# Patient Record
Sex: FEMALE | Race: WHITE | NOT HISPANIC OR LATINO | Employment: UNEMPLOYED
[De-identification: names, ages, dates, MRNs, and addresses within clinical notes are randomized per-mention and may not be internally consistent; named-entity substitution may affect disease eponyms.]

---

## 2017-11-19 ENCOUNTER — HEALTH MAINTENANCE LETTER (OUTPATIENT)
Age: 27
End: 2017-11-19

## 2020-01-16 ENCOUNTER — HOSPITAL ENCOUNTER (EMERGENCY)
Facility: CLINIC | Age: 30
Discharge: HOME | End: 2020-01-16
Attending: PHYSICIAN ASSISTANT | Admitting: PHYSICIAN ASSISTANT
Payer: COMMERCIAL

## 2020-01-16 ENCOUNTER — APPOINTMENT (OUTPATIENT)
Dept: GENERAL RADIOLOGY | Facility: CLINIC | Age: 30
End: 2020-01-16
Attending: PHYSICIAN ASSISTANT
Payer: COMMERCIAL

## 2020-01-16 VITALS
HEART RATE: 64 BPM | SYSTOLIC BLOOD PRESSURE: 119 MMHG | WEIGHT: 170 LBS | TEMPERATURE: 98.2 F | OXYGEN SATURATION: 99 % | DIASTOLIC BLOOD PRESSURE: 81 MMHG | RESPIRATION RATE: 16 BRPM

## 2020-01-16 DIAGNOSIS — N39.0 URINARY TRACT INFECTION: ICD-10-CM

## 2020-01-16 LAB
ALBUMIN SERPL-MCNC: 3.8 G/DL (ref 3.4–5)
ALBUMIN UR-MCNC: 30 MG/DL
ALP SERPL-CCNC: 70 U/L (ref 40–150)
ALT SERPL W P-5'-P-CCNC: 16 U/L (ref 0–50)
ANION GAP SERPL CALCULATED.3IONS-SCNC: 5 MMOL/L (ref 3–14)
APPEARANCE UR: ABNORMAL
AST SERPL W P-5'-P-CCNC: 11 U/L (ref 0–45)
BACTERIA #/AREA URNS HPF: ABNORMAL /HPF
BASOPHILS # BLD AUTO: 0 10E9/L (ref 0–0.2)
BASOPHILS NFR BLD AUTO: 0.5 %
BILIRUB SERPL-MCNC: 0.2 MG/DL (ref 0.2–1.3)
BILIRUB UR QL STRIP: NEGATIVE
BUN SERPL-MCNC: 10 MG/DL (ref 7–30)
CALCIUM SERPL-MCNC: 8.7 MG/DL (ref 8.5–10.1)
CHLORIDE SERPL-SCNC: 106 MMOL/L (ref 94–109)
CO2 SERPL-SCNC: 26 MMOL/L (ref 20–32)
COLOR UR AUTO: ABNORMAL
CREAT SERPL-MCNC: 0.7 MG/DL (ref 0.52–1.04)
DIFFERENTIAL METHOD BLD: ABNORMAL
EOSINOPHIL # BLD AUTO: 0 10E9/L (ref 0–0.7)
EOSINOPHIL NFR BLD AUTO: 0.8 %
ERYTHROCYTE [DISTWIDTH] IN BLOOD BY AUTOMATED COUNT: 16.3 % (ref 10–15)
GFR SERPL CREATININE-BSD FRML MDRD: >90 ML/MIN/{1.73_M2}
GLUCOSE SERPL-MCNC: 88 MG/DL (ref 70–99)
GLUCOSE UR STRIP-MCNC: NEGATIVE MG/DL
HCG UR QL: NEGATIVE
HCT VFR BLD AUTO: 36.9 % (ref 35–47)
HGB BLD-MCNC: 11.6 G/DL (ref 11.7–15.7)
HGB UR QL STRIP: ABNORMAL
IMM GRANULOCYTES # BLD: 0 10E9/L (ref 0–0.4)
IMM GRANULOCYTES NFR BLD: 0.3 %
KETONES UR STRIP-MCNC: NEGATIVE MG/DL
LEUKOCYTE ESTERASE UR QL STRIP: ABNORMAL
LYMPHOCYTES # BLD AUTO: 1.4 10E9/L (ref 0.8–5.3)
LYMPHOCYTES NFR BLD AUTO: 37.9 %
MCH RBC QN AUTO: 24.5 PG (ref 26.5–33)
MCHC RBC AUTO-ENTMCNC: 31.4 G/DL (ref 31.5–36.5)
MCV RBC AUTO: 78 FL (ref 78–100)
MONOCYTES # BLD AUTO: 0.6 10E9/L (ref 0–1.3)
MONOCYTES NFR BLD AUTO: 16.5 %
MUCOUS THREADS #/AREA URNS LPF: PRESENT /LPF
NEUTROPHILS # BLD AUTO: 1.6 10E9/L (ref 1.6–8.3)
NEUTROPHILS NFR BLD AUTO: 44 %
NITRATE UR QL: POSITIVE
NRBC # BLD AUTO: 0 10*3/UL
NRBC BLD AUTO-RTO: 0 /100
PH UR STRIP: 5 PH (ref 5–7)
PLATELET # BLD AUTO: 294 10E9/L (ref 150–450)
POTASSIUM SERPL-SCNC: 3.7 MMOL/L (ref 3.4–5.3)
PROT SERPL-MCNC: 8.1 G/DL (ref 6.8–8.8)
RBC # BLD AUTO: 4.73 10E12/L (ref 3.8–5.2)
RBC #/AREA URNS AUTO: 3 /HPF (ref 0–2)
SODIUM SERPL-SCNC: 137 MMOL/L (ref 133–144)
SOURCE: ABNORMAL
SP GR UR STRIP: 1.02 (ref 1–1.03)
SQUAMOUS #/AREA URNS AUTO: 20 /HPF (ref 0–1)
UROBILINOGEN UR STRIP-MCNC: 0 MG/DL (ref 0–2)
WBC # BLD AUTO: 3.7 10E9/L (ref 4–11)
WBC #/AREA URNS AUTO: 11 /HPF (ref 0–5)

## 2020-01-16 PROCEDURE — 25800030 ZZH RX IP 258 OP 636: Performed by: PHYSICIAN ASSISTANT

## 2020-01-16 PROCEDURE — 71046 X-RAY EXAM CHEST 2 VIEWS: CPT

## 2020-01-16 PROCEDURE — 99284 EMERGENCY DEPT VISIT MOD MDM: CPT | Mod: Z6 | Performed by: PHYSICIAN ASSISTANT

## 2020-01-16 PROCEDURE — 96365 THER/PROPH/DIAG IV INF INIT: CPT | Performed by: PHYSICIAN ASSISTANT

## 2020-01-16 PROCEDURE — 81001 URINALYSIS AUTO W/SCOPE: CPT | Performed by: PHYSICIAN ASSISTANT

## 2020-01-16 PROCEDURE — 87186 SC STD MICRODIL/AGAR DIL: CPT | Performed by: PHYSICIAN ASSISTANT

## 2020-01-16 PROCEDURE — 87088 URINE BACTERIA CULTURE: CPT | Performed by: PHYSICIAN ASSISTANT

## 2020-01-16 PROCEDURE — 99284 EMERGENCY DEPT VISIT MOD MDM: CPT | Mod: 25 | Performed by: PHYSICIAN ASSISTANT

## 2020-01-16 PROCEDURE — 80053 COMPREHEN METABOLIC PANEL: CPT | Performed by: PHYSICIAN ASSISTANT

## 2020-01-16 PROCEDURE — 96375 TX/PRO/DX INJ NEW DRUG ADDON: CPT | Performed by: PHYSICIAN ASSISTANT

## 2020-01-16 PROCEDURE — 25000128 H RX IP 250 OP 636: Performed by: PHYSICIAN ASSISTANT

## 2020-01-16 PROCEDURE — 87086 URINE CULTURE/COLONY COUNT: CPT | Performed by: PHYSICIAN ASSISTANT

## 2020-01-16 PROCEDURE — 81025 URINE PREGNANCY TEST: CPT | Performed by: PHYSICIAN ASSISTANT

## 2020-01-16 PROCEDURE — 96361 HYDRATE IV INFUSION ADD-ON: CPT | Performed by: PHYSICIAN ASSISTANT

## 2020-01-16 PROCEDURE — 85025 COMPLETE CBC W/AUTO DIFF WBC: CPT | Performed by: PHYSICIAN ASSISTANT

## 2020-01-16 RX ORDER — SULFAMETHOXAZOLE/TRIMETHOPRIM 800-160 MG
1 TABLET ORAL 2 TIMES DAILY
Qty: 14 TABLET | Refills: 0 | Status: SHIPPED | OUTPATIENT
Start: 2020-01-16 | End: 2020-01-23

## 2020-01-16 RX ORDER — SODIUM CHLORIDE 9 MG/ML
1000 INJECTION, SOLUTION INTRAVENOUS CONTINUOUS
Status: DISCONTINUED | OUTPATIENT
Start: 2020-01-16 | End: 2020-01-16 | Stop reason: HOSPADM

## 2020-01-16 RX ORDER — ONDANSETRON 2 MG/ML
4 INJECTION INTRAMUSCULAR; INTRAVENOUS ONCE
Status: COMPLETED | OUTPATIENT
Start: 2020-01-16 | End: 2020-01-16

## 2020-01-16 RX ORDER — BENZONATATE 200 MG/1
200 CAPSULE ORAL 3 TIMES DAILY PRN
Qty: 30 CAPSULE | Refills: 0 | Status: SHIPPED | OUTPATIENT
Start: 2020-01-16 | End: 2020-02-15

## 2020-01-16 RX ORDER — CEFTRIAXONE SODIUM 1 G/50ML
1 INJECTION, SOLUTION INTRAVENOUS ONCE
Status: COMPLETED | OUTPATIENT
Start: 2020-01-16 | End: 2020-01-16

## 2020-01-16 RX ADMIN — SODIUM CHLORIDE 1000 ML: 9 INJECTION, SOLUTION INTRAVENOUS at 10:48

## 2020-01-16 RX ADMIN — ONDANSETRON 4 MG: 2 INJECTION INTRAMUSCULAR; INTRAVENOUS at 10:48

## 2020-01-16 RX ADMIN — CEFTRIAXONE SODIUM 1 G: 1 INJECTION, SOLUTION INTRAVENOUS at 11:55

## 2020-01-16 ASSESSMENT — ENCOUNTER SYMPTOMS
CHILLS: 1
FEVER: 1
FLANK PAIN: 0
ABDOMINAL PAIN: 1
EYE DISCHARGE: 0
HEADACHES: 1
COUGH: 1
ACTIVITY CHANGE: 1
SHORTNESS OF BREATH: 0
WEAKNESS: 0
VOMITING: 1
SORE THROAT: 0
DIZZINESS: 1
DIARRHEA: 1
FATIGUE: 1
BACK PAIN: 1
PALPITATIONS: 0
PHOTOPHOBIA: 0
LIGHT-HEADEDNESS: 1
NAUSEA: 1
EYE ITCHING: 0
APPETITE CHANGE: 1
WHEEZING: 0
BLOOD IN STOOL: 0
WOUND: 0
EYE REDNESS: 0
COLOR CHANGE: 0
EYE PAIN: 0

## 2020-01-16 NOTE — ED AVS SNAPSHOT
[facility] Emergency Department  [address] [address]  Phone:  [phone]  Fax:  [phone]                                    Meme Mcintosh   MRN: [id number]    Department:  [facility] Emergency Department   Date of Visit:  1/16/2020           After Visit Summary Signature Page    I have received my discharge instructions, and my questions have been answered. I have discussed any challenges I see with this plan with the nurse or doctor.    ..........................................................................................................................................  Patient/Patient Representative Signature      ..........................................................................................................................................  Patient Representative Print Name and Relationship to Patient    ..................................................               ................................................  Date                                   Time    ..........................................................................................................................................  Reviewed by Signature/Title    ...................................................              ..............................................  Date                                               Time          22EPIC Rev 08/18

## 2020-01-16 NOTE — ED PROVIDER NOTES
History     Chief Complaint   Patient presents with     Emesis     onset on monday with diarrhea     Dizziness     Back Pain     Headache     HPI  Meme Mcintosh is a 29 year old female who presents to the emergency department with concern over multiple symptoms from present for the last 4 days.  Patient complained of fever up to 102.5, chills, myalgias, headache, chest pain, cough, nausea, vomiting, diarrhea, discomfort.  She is unable to quantify exactly how many time she vomited per day and states that while she has had nausea some episodes of vomiting more clearly posttussive.  She similarly unable to quantify her diarrhea.  She became more concerned when she reports that she coughed and vomited this morning and there was a small amount of blood tinged material present.  She also complains of dizziness which she describes as sensation of vertigo room/spinning and left ear pain. She denies any hearing changes.  She does complain of dysuria, increased urgency.  She denies any melena, hematochezia, hematuria, wheezing.  She denies any history of significant past medical issues, epic records indicate she was evaluated in the emergency department for pyelonephritis several years ago.  Past surgical history is significant for tubal ligation in 2014.  She denies any smoking history.  She is exposed to secondhand smoke through her significant other however    Allergies:  No Known Allergies    Problem List:    Patient Active Problem List    Diagnosis Date Noted     LSIL (low grade squamous intraepithelial lesion) on Pap smear 11/21/2012     Priority: Medium     11/16/12:Pap--LSIL. Plan [place]    2/20/13: [place] and will be transferring care. Pap tracking file closed.        Generalized anxiety disorder 11/16/2012     Priority: Medium     Diagnosis updated by automated process. Provider to review and confirm.       CARDIOVASCULAR SCREENING; LDL GOAL LESS THAN 160 10/09/2012     Priority: Medium      Past  Medical History:    Past Medical History:   Diagnosis Date     LSIL (low grade squamous intraepithelial lesion) on Pap smear 11/2012     Past Surgical History:    No past surgical history on file.    Family History:    No family history on file.    Social History:  Marital Status:  Single [1]  Social History     Tobacco Use     Smoking status: Never Smoker     Smokeless tobacco: Never Used   Substance Use Topics     Alcohol use: No     Drug use: No      Medications:    ibuprofen (ADVIL,MOTRIN) 200 MG tablet      Review of Systems   Constitutional: Positive for activity change, appetite change, chills, fatigue and fever.   HENT: Positive for congestion and ear pain (left ear). Negative for sore throat.    Eyes: Negative for photophobia, pain, discharge, redness, itching and visual disturbance.   Respiratory: Positive for cough. Negative for shortness of breath and wheezing.    Cardiovascular: Positive for chest pain. Negative for palpitations and leg swelling.   Gastrointestinal: Positive for abdominal pain, diarrhea, nausea and vomiting. Negative for blood in stool.   Genitourinary: Negative for flank pain.   Musculoskeletal: Positive for back pain.   Skin: Negative for color change, rash and wound.   Neurological: Positive for dizziness, light-headedness and headaches. Negative for syncope and weakness.     Physical Exam   BP: 116/80  Heart Rate: 99  Temp: 98.2  F (36.8  C)  Resp: 16  Weight: 77.1 kg (170 lb)  SpO2: 100 %  Physical Exam  GENERAL APPEARANCE: healthy, alert and no distress  EYES: EOMI,  PERRL, conjunctiva clear  HENT: ear canals and TM's normal.  Nose and mouth without ulcers, erythema or lesions  NECK: supple, nontender, no lymphadenopathy  RESP: lungs clear to auscultation - no rales, rhonchi or wheezes  CV: regular rates and rhythm, normal S1 S2, no murmur noted  ABDOMEN:  soft, nontender, no HSM or masses and bowel sounds normal, no CVA tenderness   NEURO: Normal strength and tone, sensory exam  grossly normal,  normal speech and mentation, CN III-XII grossly intact   SKIN: no suspicious lesions or rashes  ED Course        Procedures        Critical Care time:  none        Results for orders placed or performed during the hospital encounter of 01/16/20 (from the past 24 hour(s))   CBC with platelets, differential   Result Value Ref Range    WBC 3.7 (L) 4.0 - 11.0 10e9/L    RBC Count 4.73 3.8 - 5.2 10e12/L    Hemoglobin 11.6 (L) 11.7 - 15.7 g/dL    Hematocrit 36.9 35.0 - 47.0 %    MCV 78 78 - 100 fl    MCH 24.5 (L) 26.5 - 33.0 pg    MCHC 31.4 (L) 31.5 - 36.5 g/dL    RDW 16.3 (H) 10.0 - 15.0 %    Platelet Count 294 150 - 450 10e9/L    Diff Method Automated Method     % Neutrophils 44.0 %    % Lymphocytes 37.9 %    % Monocytes 16.5 %    % Eosinophils 0.8 %    % Basophils 0.5 %    % Immature Granulocytes 0.3 %    Nucleated RBCs 0 0 /100    Absolute Neutrophil 1.6 1.6 - 8.3 10e9/L    Absolute Lymphocytes 1.4 0.8 - 5.3 10e9/L    Absolute Monocytes 0.6 0.0 - 1.3 10e9/L    Absolute Eosinophils 0.0 0.0 - 0.7 10e9/L    Absolute Basophils 0.0 0.0 - 0.2 10e9/L    Abs Immature Granulocytes 0.0 0 - 0.4 10e9/L    Absolute Nucleated RBC 0.0    Comprehensive metabolic panel   Result Value Ref Range    Sodium 137 133 - 144 mmol/L    Potassium 3.7 3.4 - 5.3 mmol/L    Chloride 106 94 - 109 mmol/L    Carbon Dioxide 26 20 - 32 mmol/L    Anion Gap 5 3 - 14 mmol/L    Glucose 88 70 - 99 mg/dL    Urea Nitrogen 10 7 - 30 mg/dL    Creatinine 0.70 0.52 - 1.04 mg/dL    GFR Estimate >90 >60 mL/min/[1.73_m2]    GFR Estimate If Black >90 >60 mL/min/[1.73_m2]    Calcium 8.7 8.5 - 10.1 mg/dL    Bilirubin Total 0.2 0.2 - 1.3 mg/dL    Albumin 3.8 3.4 - 5.0 g/dL    Protein Total 8.1 6.8 - 8.8 g/dL    Alkaline Phosphatase 70 40 - 150 U/L    ALT 16 0 - 50 U/L    AST 11 0 - 45 U/L   HCG qualitative urine   Result Value Ref Range    HCG Qual Urine Negative NEG^Negative   UA with Microscopic   Result Value Ref Range    Color Urine Elo      Appearance Urine Cloudy     Glucose Urine Negative NEG^Negative mg/dL    Bilirubin Urine Negative NEG^Negative    Ketones Urine Negative NEG^Negative mg/dL    Specific Gravity Urine 1.023 1.003 - 1.035    Blood Urine Moderate (A) NEG^Negative    pH Urine 5.0 5.0 - 7.0 pH    Protein Albumin Urine 30 (A) NEG^Negative mg/dL    Urobilinogen mg/dL 0.0 0.0 - 2.0 mg/dL    Nitrite Urine Positive (A) NEG^Negative    Leukocyte Esterase Urine Small (A) NEG^Negative    Source Midstream Urine     WBC Urine 11 (H) 0 - 5 /HPF    RBC Urine 3 (H) 0 - 2 /HPF    Bacteria Urine Many (A) NEG^Negative /HPF    Squamous Epithelial /HPF Urine 20 (H) 0 - 1 /HPF    Mucous Urine Present (A) NEG^Negative /LPF   Chest XR,  PA & LAT    Narrative    CHEST TWO VIEWS  1/16/2020 11:26 AM     HISTORY: 29-year-old woman with cough.    COMPARISON: None      Impression    IMPRESSION: Heart size is normal. No pleural effusion, pneumothorax,  or abnormal area of consolidation.      Medications   0.9% sodium chloride BOLUS (0 mLs Intravenous Stopped 1/16/20 1155)     Followed by   sodium chloride 0.9% infusion (has no administration in time range)   cefTRIAXone in d5w (ROCEPHIN) intermittent infusion 1 g (1 g Intravenous New Bag 1/16/20 1155)   ondansetron (ZOFRAN) injection 4 mg (4 mg Intravenous Given 1/16/20 1048)     Assessments & Plan (with Medical Decision Making)     I have reviewed the nursing notes.  I have reviewed the findings, diagnosis, plan and need for follow up with the patient.     New Prescriptions    BENZONATATE (TESSALON) 200 MG CAPSULE    Take 1 capsule (200 mg) by mouth 3 times daily as needed for cough    SULFAMETHOXAZOLE-TRIMETHOPRIM (BACTRIM DS) 800-160 MG TABLET    Take 1 tablet by mouth 2 times daily for 7 days     Final diagnoses:   Urinary tract infection     29-year-old female presents to the emergency department accompanied by significant other with concern over 4-day history of multiple complaints including fever, chills,  myalgias, cough, chest pain, nausea, vomiting, diarrhea.  She had stable vital signs upon arrival.  Physical exam findings as described above included benign nonsurgical abdominal exam.  As part of evaluation patient did have laboratory testing including CBC which showed a low total WBC at 3.7, mild anemia with hemoglobin of 11.6.  Non-concerning metabolic panel.  Urinalysis did show evidence of infection with positive for nitrates, many bacteria, however did not have as many WBC as I would typically expect with pyelonephritis and did have numerous squamous epithelial cells.  She had a chest x-ray which did not demonstrate any evidence of pneumothorax, pleural effusion or change in cardiopulmonary vasculature.  I suspect that she likely has a viral illness, would consider possibility of influenza however given duration of symptoms, absence of treatment options patient declined testing at this time.  Differential would also include gastroenteritis, other viral URI.  I do not suspect MI/ACS.  She was treated with dose of Zofran, IV fluids, rocephin and did report some symptomatic relief.  She was discharged home stable with prescription for Bactrim pending urine culture results from today's visit.  Follow-up if no improvement within the next 3-5 days.  Worrisome reasons to return to ER sooner discussed.     Disclaimer: This note consists of symbols derived from keyboarding, dictation, and/or voice recognition software. As a result, there may be errors in the script that have gone undetected.  Please consider this when interpreting information found in the chart.    1/16/2020   [facility] EMERGENCY DEPARTMENT     Elo Anne PA-C  01/16/20 2254

## 2020-01-16 NOTE — RESULT ENCOUNTER NOTE
Emergency Dept/Urgent Care discharge antibiotic (if prescribed): Sulfamethoxazole-Trimethoprim (Bactrim DS, Septra DS) 800-160 mg PO tablet,  1 tablet by mouth 2 times daily for 7 days.  Date of Rx (if applicable):  1/16/2020  No changes in treatment per Urine culture protocol.

## 2020-01-19 LAB
BACTERIA SPEC CULT: ABNORMAL
Lab: ABNORMAL
SPECIMEN SOURCE: ABNORMAL

## 2020-01-20 ENCOUNTER — TELEPHONE (OUTPATIENT)
Dept: EMERGENCY MEDICINE | Facility: CLINIC | Age: 30
End: 2020-01-20

## 2020-01-21 NOTE — TELEPHONE ENCOUNTER
[facility] Emergency Department/Urgent Care Lab result notification [Positive for uti and bacteria is susceptible to antibiotic]:    Reason for call:   Notify of Final urine culture result, confirm patient is taking antibiotic, assess symptoms, and advise per Emergency Dept/Urgent Care discharge instructions and Emergency Dept urine culture protocol.    Lab Result & Date of Final Report [copied from Result Note]:    Final Urine Culture Report on 1/19/20  Emergency Dept/Urgent Care discharge antibiotic prescribed: Sulfamethoxazole-Trimethoprim (Bactrim DS, Septra DS) 800-160 mg PO tablet,  1 tablet by mouth 2 times daily for 7 days.  #1. Bacteria, >100,000 colonies/mL Escherichia coli, is SUSCEPTIBLE to Antibiotic.    As per [place] ED Lab Result protocol, no change in antibiotic therapy.    Current symptoms (include time patient called):    6;28PM: Spoke with patient. She states she is improving.     Recommendations/Instructions:   Patient notified of lab result and treatment recommendation.   Take antibiotic as directed by the Emergency Dept/Urgent Care Provider.  Advised to follow up with PCP as directed by the ED provider.  The patient is comfortable with the information given and was transferred to scheduling to make a clinic appointment.     UTI prevention/education reviewed with patient [Yes/No]:  Yes    Please contact you PCP or return to the Emergency Department if your:    Symptoms worsen or other concerning symptoms    Gena Roberson RN  InComm RN  Lung Nodule and ED Lab Result RN  Epic pool (ED late result f/u RN): [id number]  [facility] INCIDENTAL RADIOLOGY F/U NURSES: [id number]  # [phone]

## 2020-10-21 ENCOUNTER — VIRTUAL VISIT (OUTPATIENT)
Dept: FAMILY MEDICINE | Facility: CLINIC | Age: 30
End: 2020-10-21
Payer: COMMERCIAL

## 2020-10-21 ENCOUNTER — ALLIED HEALTH/NURSE VISIT (OUTPATIENT)
Dept: FAMILY MEDICINE | Facility: CLINIC | Age: 30
End: 2020-10-21
Payer: COMMERCIAL

## 2020-10-21 DIAGNOSIS — J02.9 SORE THROAT: ICD-10-CM

## 2020-10-21 DIAGNOSIS — J02.0 STREPTOCOCCAL PHARYNGITIS: Primary | ICD-10-CM

## 2020-10-21 LAB
DEPRECATED S PYO AG THROAT QL EIA: POSITIVE
SPECIMEN SOURCE: ABNORMAL

## 2020-10-21 PROCEDURE — 99207 PR NO CHARGE NURSE ONLY: CPT

## 2020-10-21 PROCEDURE — 99213 OFFICE O/P EST LOW 20 MIN: CPT | Mod: 95 | Performed by: NURSE PRACTITIONER

## 2020-10-21 PROCEDURE — 87880 STREP A ASSAY W/OPTIC: CPT | Performed by: NURSE PRACTITIONER

## 2020-10-21 RX ORDER — AMOXICILLIN 500 MG/1
500 CAPSULE ORAL 2 TIMES DAILY
Qty: 20 CAPSULE | Refills: 0 | Status: SHIPPED | OUTPATIENT
Start: 2020-10-21 | End: 2020-10-31

## 2020-10-21 ASSESSMENT — ENCOUNTER SYMPTOMS
SINUS PRESSURE: 0
HEADACHES: 0
DIARRHEA: 0
COUGH: 0
MYALGIAS: 0
FATIGUE: 1
SHORTNESS OF BREATH: 0
FEVER: 0
CHILLS: 0
CARDIOVASCULAR NEGATIVE: 1
RHINORRHEA: 0
ABDOMINAL PAIN: 0
SORE THROAT: 1
VOMITING: 0
NAUSEA: 0
APPETITE CHANGE: 0

## 2020-10-21 NOTE — PATIENT INSTRUCTIONS
Your rapid strep test was positive. You have strep throat.  1. Take antibiotics as prescribed.  2. You are contagious until you've been on antibiotics for 24 hours.   3. Take a probiotics or yogurt daily while on antibiotics and for ~1 week afterwards to prevent stomach upset.  4. Rest, hydration, humidification.  5. Cepacol lozenges can help with sore throat.  6. Change tooth brush out or disinfect it if it is an electric tooth brush after being on antibiotics for 24 hours.   7. Warm liquids/tea with honey can help relieve sore throat.

## 2020-10-21 NOTE — PROGRESS NOTES
"Meme Mcintosh is a 30 year old female who is being evaluated via a billable telephone visit.      The patient has been notified of following:     \"This telephone visit will be conducted via a call between you and your physician/provider. We have found that certain health care needs can be provided without the need for a physical exam.  This service lets us provide the care you need with a short phone conversation.  If a prescription is necessary we can send it directly to your pharmacy.  If lab work is needed we can place an order for that and you can then stop by our lab to have the test done at a later time.    Telephone visits are billed at different rates depending on your insurance coverage. During this emergency period, for some insurers they may be billed the same as an in-person visit.  Please reach out to your insurance provider with any questions.    If during the course of the call the physician/provider feels a telephone visit is not appropriate, you will not be charged for this service.\"    Patient has given verbal consent for Telephone visit?  Yes    What phone number would you like to be contacted at? [phone]    How would you like to obtain your AVS? Mail a copy    Subjective     Meme Mcintosh is a 30 year old female who presents via phone visit today for the following health issues:    HPI       Concern for strep  Declined to respond to COVID questions  Patient thinks her and her 2 children have strep throat  About how many days ago did these symptoms start? Since 10/17/2020  Is this your first visit for this illness? Yes  In the 14 days before your symptoms started, have you had close contact with someone with COVID-19 (Coronavirus)? No  Exposure to strep  Do you have a fever or chills? No  Are you having new or worsening difficulty breathing? No  Do you have new or worsening cough? No  Have you had any new or unexplained body aches? No    Have you experienced any of the following NEW " symptoms?    Headache: No    Sore throat: YES    Loss of taste or smell: No    Chest pain: No    Diarrhea: No    Rash: No  What treatments have you tried? none  Who do you live with? Declined to respond to COVID questions  Are you, or a household member, a healthcare worker or a ? Declined to respond to COVID questions  Do you live in a nursing home, group home, or shelter? No  Do you have a way to get food/medications if quarantined? Declined to respond to COVID questions          Additional provider notes: Sore throat started Saturday night. Also have 2 kids with similar symptoms. No fevers. She visited their friend who's daughter was being treated for strep.     Review of Systems   Constitutional: Positive for fatigue. Negative for appetite change, chills and fever.   HENT: Positive for ear pain and sore throat. Negative for congestion, rhinorrhea and sinus pressure.    Respiratory: Negative for cough and shortness of breath.    Cardiovascular: Negative.    Gastrointestinal: Negative for abdominal pain, diarrhea, nausea and vomiting.   Musculoskeletal: Negative for myalgias.   Skin: Negative for rash.   Neurological: Negative for headaches.             Objective          Vitals:  No vitals were obtained today due to virtual visit.    healthy, alert, mild distress and cooperative  PSYCH: Alert and oriented times 3; coherent speech, normal   rate and volume, able to articulate logical thoughts, able   to abstract reason, no tangential thoughts, no hallucinations   or delusions  Her affect is normal  RESP: No cough, no audible wheezing, able to talk in full sentences  Remainder of exam unable to be completed due to telephone visits            Assessment/Plan:    Assessment & Plan     Streptococcal pharyngitis  Patient declined COVID testing with strep testing despite meeting [facility] guidelines for testing. Patient and kids came to clinic for drive up throat swab. Strep test came back positive. Amoxil  prescribed. Side effects, risks and benefits of medication were discussed with patient. Discussed how and when to take medication. Recommended taking a probiotic and/or eating yogurt every day while on antibiotics and for ~1 week after stopping antibiotics to prevent GI upset. Discussed OTC recommendations for symptom control. Rest, hydration. Discussed contagiousness and when to change out tooth brush.     *Advised mom that if symptoms do not improve by the weekend, she should get COVID testing done as there is always the chance of having both at the same time especially since her children did not come back positive for strep today. Patient in agreement. She is going to quarantine through the weekend and get COVID testing if symptoms do not improve.     - amoxicillin (AMOXIL) 500 MG capsule; Take 1 capsule (500 mg) by mouth 2 times daily for 10 days    Sore throat  Positive.     - Streptococcus A Rapid Scr w Reflx to PCR; Future        Patient Instructions   Your rapid strep test was positive. You have strep throat.  1. Take antibiotics as prescribed.  2. You are contagious until you've been on antibiotics for 24 hours.   3. Take a probiotics or yogurt daily while on antibiotics and for ~1 week afterwards to prevent stomach upset.  4. Rest, hydration, humidification.  5. Cepacol lozenges can help with sore throat.  6. Change tooth brush out or disinfect it if it is an electric tooth brush after being on antibiotics for 24 hours.   7. Warm liquids/tea with honey can help relieve sore throat.        Return if symptoms worsen or fail to improve.    FELICITA Deshpande [facility]    Phone call duration:  7 minutes

## 2022-08-31 ENCOUNTER — HOSPITAL ENCOUNTER (EMERGENCY)
Facility: CLINIC | Age: 32
Discharge: HOME | End: 2022-08-31
Attending: PHYSICIAN ASSISTANT | Admitting: PHYSICIAN ASSISTANT
Payer: COMMERCIAL

## 2022-08-31 ENCOUNTER — APPOINTMENT (OUTPATIENT)
Dept: GENERAL RADIOLOGY | Facility: CLINIC | Age: 32
End: 2022-08-31
Attending: PHYSICIAN ASSISTANT
Payer: COMMERCIAL

## 2022-08-31 VITALS
OXYGEN SATURATION: 99 % | HEART RATE: 60 BPM | DIASTOLIC BLOOD PRESSURE: 69 MMHG | SYSTOLIC BLOOD PRESSURE: 105 MMHG | TEMPERATURE: 97 F

## 2022-08-31 DIAGNOSIS — S20.229A BACK CONTUSION: ICD-10-CM

## 2022-08-31 PROCEDURE — 72100 X-RAY EXAM L-S SPINE 2/3 VWS: CPT

## 2022-08-31 PROCEDURE — G0463 HOSPITAL OUTPT CLINIC VISIT: HCPCS | Performed by: PHYSICIAN ASSISTANT

## 2022-08-31 PROCEDURE — 99213 OFFICE O/P EST LOW 20 MIN: CPT | Performed by: PHYSICIAN ASSISTANT

## 2022-08-31 PROCEDURE — 72072 X-RAY EXAM THORAC SPINE 3VWS: CPT

## 2022-08-31 RX ORDER — CYCLOBENZAPRINE HCL 10 MG
10 TABLET ORAL 3 TIMES DAILY PRN
Qty: 20 TABLET | Refills: 0 | Status: SHIPPED | OUTPATIENT
Start: 2022-08-31 | End: 2022-09-07

## 2022-08-31 ASSESSMENT — ENCOUNTER SYMPTOMS
NAUSEA: 0
FREQUENCY: 1
COLOR CHANGE: 0
FEVER: 0
CHILLS: 0
SHORTNESS OF BREATH: 0
COUGH: 0
WEAKNESS: 0
WHEEZING: 0
DYSURIA: 1
VOMITING: 0
PALPITATIONS: 0
BACK PAIN: 1
NUMBNESS: 0
WOUND: 0

## 2022-08-31 NOTE — ED PROVIDER NOTES
History     Chief Complaint   Patient presents with     Back Pain     Had car door hit back on Monday,and today is having worsening back pain.      HPI  Meme Mcintosh is a 32 year old female who presents to urgent care with concern over back pain which been present for the last 3 days.  Patient reports that she was leaning forward into her trunk when the lid of the trunk fell hitting her back.  She had sudden onset of pain which has been gradually worsening with time.  She describes it as a sharp, throbbing.  It radiates to her shoulder blades bilaterally and down both of her legs to to toes.  It is exacerbated by movements, palpation,  deep breathing,   She has had some possible ecchymosis per her boyfriend.  She also complains of dysuria, increased urinary frequency, urgency and paresthesias in her lower legs.  She denies any objective fever, chills, allergies, cough, chest pain, dyspnea, wheezing, nausea, vomiting, abdominal pain, saddle numbness or paresthesias or loss of control of her bowel or bladder.  She has been attempting to treat with doses of ibuprofen and Tylenol without relief.    Allergies:  No Known Allergies    Problem List:    Patient Active Problem List    Diagnosis Date Noted     LSIL (low grade squamous intraepithelial lesion) on Pap smear 11/21/2012     Priority: Medium     11/16/12:Pap--LSIL. Plan [place]    2/20/13: [place] and will be [place] care. Pap tracking file closed.        Generalized anxiety disorder 11/16/2012     Priority: Medium     Diagnosis updated by automated process. Provider to review and confirm.       CARDIOVASCULAR SCREENING; LDL GOAL LESS THAN 160 10/09/2012     Priority: Medium        Past Medical History:    Past Medical History:   Diagnosis Date     LSIL (low grade squamous intraepithelial lesion) on Pap smear 11/2012       Past Surgical History:    History reviewed. No pertinent surgical history.    Family History:    History reviewed. No  pertinent family history.    Social History:  Marital Status:  Single [1]  Social History     Tobacco Use     Smoking status: Never Smoker     Smokeless tobacco: Never Used   Substance Use Topics     Alcohol use: No     Drug use: No        Medications:    ibuprofen (ADVIL,MOTRIN) 200 MG tablet      Review of Systems   Constitutional: Negative for chills and fever.   Respiratory: Negative for cough, shortness of breath and wheezing.    Cardiovascular: Negative for chest pain and palpitations.   Gastrointestinal: Negative for nausea and vomiting.   Genitourinary: Positive for dysuria, frequency and urgency.   Musculoskeletal: Positive for back pain.   Skin: Negative for color change, rash and wound.   Neurological: Negative for weakness and numbness.     Physical Exam   BP: 105/69  Pulse: 60  Temp: 97  F (36.1  C)  SpO2: 99 %  Physical Exam  Constitutional:       Appearance: She is not ill-appearing or toxic-appearing.   HENT:      Head: Normocephalic and atraumatic.   Cardiovascular:      Rate and Rhythm: Normal rate and regular rhythm.      Heart sounds: No murmur heard.    No friction rub. No gallop.   Pulmonary:      Effort: Pulmonary effort is normal. No respiratory distress.      Breath sounds: Normal breath sounds. No wheezing, rhonchi or rales.   Musculoskeletal:      Cervical back: Normal.      Thoracic back: Tenderness present. No swelling, edema, deformity, lacerations or spasms. Decreased range of motion. No scoliosis.      Lumbar back: Tenderness present. No swelling, edema, deformity, signs of trauma, lacerations or spasms. Decreased range of motion.      Comments: Patient complains of significant tenderness to palpation to light touch of skin of back    Skin:     General: Skin is warm and dry.      Findings: No abrasion, ecchymosis, erythema, laceration or rash.   Neurological:      Mental Status: She is alert.      Sensory: No sensory deficit.      Deep Tendon Reflexes:      Reflex Scores:        Patellar reflexes are 2+ on the right side and 2+ on the left side.        ED Course           Procedures       Critical Care time:  none          Results for orders placed or performed during the hospital encounter of 08/31/22   Thoracic spine XR, 3 views     Status: None    Narrative    THORACIC SPINE THREE VIEWS  8/31/2022 2:15 PM     COMPARISON: None.    HISTORY: Pain after truck of car fell onto back as she was lying  forward.      Impression    IMPRESSION: Normal alignment. Vertebral body heights normal. No  fractures.    AKI HALLMAN MD         SYSTEM ID:  [id number]   Lumbar spine XR, 2-3 views     Status: None    Narrative    LUMBAR SPINE TWO TO THREE VIEWS August 31, 2022 2:14 PM     HISTORY: Pain after object fell on back.    COMPARISON: None.      Impression    IMPRESSION: Normal alignment. Vertebral body heights normal. No  fractures. No significant degenerative change.    AKI HALLMAN MD         SYSTEM ID:  [id number]       Medications - No data to display    Assessments & Plan (with Medical Decision Making)     I have reviewed the nursing notes.    I have reviewed the findings, diagnosis, plan and need for follow up with the patient.       Discharge Medication List as of 8/31/2022  2:44 PM      START taking these medications    Details   cyclobenzaprine (FLEXERIL) 10 MG tablet Take 1 tablet (10 mg) by mouth 3 times daily as needed for muscle spasms, Disp-20 tablet, R-0, E-Prescribe           Final diagnoses:   Back contusion     22-year-old female presents to urgent care with concern over back pain after a she reports that barnard of truck fell on her back as she was leaning forward.  She had stable vital signs upon arrival.  Physical exam findings significant for pain to light touch diffusely throughout her back, decreased range of motion.  No objective ecchymosis, swelling.  She had x-ray of her lumbar, thoracic spine which was negative for acute bony abnormality.  Symptoms most consistent with back  contusion would consider muscle sprain/strain.  I do not suspect DDD, spinal stenosis, herniated disc.  I have low suspicion for pyelonephritis however given her state urinary complaints I did recommend urinalysis which patient declined to complete.  She was discharged home stable with instructions for symptomatic treatment with rest, ice/heat, Tylenol/ibuprofen.  Prescription for Flexeril given.  Follow-up if no improvement within the next 7 days.  Worrisome reasons to return to the ER/UC sooner discussed.    Disclaimer: This note consists of symbols derived from keyboarding, dictation, and/or voice recognition software. As a result, there may be errors in the script that have gone undetected.  Please consider this when interpreting information found in the chart.      8/31/2022   [facility] EMERGENCY DEPT     Elo Anne PA-C  09/02/22 2011

## 2022-08-31 NOTE — Clinical Note
Meme Mcintosh was seen and treated in our emergency department on 8/31/2022.    I recommend she rest her back with limited activity only as tolerated by comfort for the next 5 days until her next follow-up appointment.  During this time she should avoid any heavy lifting greater than 10 pounds, twisting, turning of the back, repetitive movements or other activities that exacerbate her symptoms.       Sincerely,     [facility] Emergency Dept

## 2022-09-07 ENCOUNTER — TELEPHONE (OUTPATIENT)
Dept: FAMILY MEDICINE | Facility: CLINIC | Age: 32
End: 2022-09-07

## 2022-09-07 ENCOUNTER — OFFICE VISIT (OUTPATIENT)
Dept: FAMILY MEDICINE | Facility: CLINIC | Age: 32
End: 2022-09-07
Payer: COMMERCIAL

## 2022-09-07 VITALS
DIASTOLIC BLOOD PRESSURE: 68 MMHG | OXYGEN SATURATION: 98 % | BODY MASS INDEX: 23.02 KG/M2 | TEMPERATURE: 97.5 F | HEART RATE: 73 BPM | SYSTOLIC BLOOD PRESSURE: 100 MMHG | WEIGHT: 147 LBS | RESPIRATION RATE: 18 BRPM

## 2022-09-07 DIAGNOSIS — F33.2 SEVERE EPISODE OF RECURRENT MAJOR DEPRESSIVE DISORDER, WITHOUT PSYCHOTIC FEATURES (H): Primary | ICD-10-CM

## 2022-09-07 DIAGNOSIS — M54.6 BILATERAL THORACIC BACK PAIN, UNSPECIFIED CHRONICITY: ICD-10-CM

## 2022-09-07 PROCEDURE — 99214 OFFICE O/P EST MOD 30 MIN: CPT | Performed by: NURSE PRACTITIONER

## 2022-09-07 PROCEDURE — 96127 BRIEF EMOTIONAL/BEHAV ASSMT: CPT | Performed by: NURSE PRACTITIONER

## 2022-09-07 RX ORDER — ACETAMINOPHEN 325 MG/1
325-650 TABLET ORAL EVERY 6 HOURS PRN
COMMUNITY

## 2022-09-07 ASSESSMENT — COLUMBIA-SUICIDE SEVERITY RATING SCALE - C-SSRS
5. IN THE PAST MONTH, HAVE YOU STARTED TO WORK OUT OR WORKED OUT THE DETAILS OF HOW TO KILL YOURSELF? DO YOU INTEND TO CARRY OUT THIS PLAN?: NO
6. HAVE YOU EVER DONE ANYTHING, STARTED TO DO ANYTHING, OR PREPARED TO DO ANYTHING TO END YOUR LIFE?: NO
1. WITHIN THE PAST MONTH, HAVE YOU WISHED YOU WERE DEAD OR WISHED YOU COULD GO TO SLEEP AND NOT WAKE UP?: YES
4. IN THE PAST MONTH, HAVE YOU HAD THESE THOUGHTS AND HAD SOME INTENTION OF ACTING ON THEM?: NO
2. IN THE PAST MONTH, HAVE YOU ACTUALLY HAD ANY THOUGHTS OF KILLING YOURSELF?: YES
3. IN THE PAST MONTH, HAVE YOU BEEN THINKING ABOUT HOW YOU MIGHT KILL YOURSELF?: NO

## 2022-09-07 ASSESSMENT — PAIN SCALES - GENERAL: PAINLEVEL: NO PAIN (0)

## 2022-09-07 ASSESSMENT — PATIENT HEALTH QUESTIONNAIRE - PHQ9
SUM OF ALL RESPONSES TO PHQ QUESTIONS 1-9: 26
SUM OF ALL RESPONSES TO PHQ QUESTIONS 1-9: 26
10. IF YOU CHECKED OFF ANY PROBLEMS, HOW DIFFICULT HAVE THESE PROBLEMS MADE IT FOR YOU TO DO YOUR WORK, TAKE CARE OF THINGS AT HOME, OR GET ALONG WITH OTHER PEOPLE: EXTREMELY DIFFICULT

## 2022-09-07 NOTE — LETTER
September 7, 2022      Meme Mcintosh  [address] [place] [id number]        To Whom It May Concern:    Meme Mcintosh  was seen on 9/7/2022.  Please excuse her  until 9/8/2022 due to injury.        Sincerely,        FELICITA Stephens CNP

## 2022-09-07 NOTE — TELEPHONE ENCOUNTER
Pt calls & states she was seen by JAYDON Montenegro today & received return to work letter.   Pt states she needs to return to work today & would like letter to state she is able to return to work today 9/7/22.  Pt does not have mychart. Would need to  letter.    Routing to provider for review.    Arianna Hickey RN

## 2022-09-07 NOTE — PATIENT INSTRUCTIONS
"Possible side effects of antidepressants/anxiety meds, including but not limited to GI upset, disrupted sleep, loss of libido, worsening of mood or even possible risk of increased suicidal thoughts.   Often some of these things if not severe will improve after 1-2 weeks on medications but some may not see effects for 3-4 weeks,  if tolerable patients should continue meds and see if there is improvement.  If symptoms are intolerable or for any suicidal thoughts the medication should be stopped immediately and contact the clinic.       These medications should be used for 6-9 months before stopping, to avoid rebound symptoms.   Contact the clinic if having any problems tolerating these medications.  Take the medication daily and do not stop the medication abruptly.  Examples of Relaxation or Mindfulness Apps (available for download on Android and iOS)  Headspace  CBT-I : helps with anxiety and insomnia  Moodpath: helps with depression and/or anxiety  Mindfulness : learn mindfulness and meditation skills to help with depression and anxiety  PTSD : helps address trauma  Mindshift: helps teens and young adults who have depression or anxiety    Books to help with anxiety/depression  The Chemistry of Maryan by Adriano Ohara (also has workbook)  The Chemistry of Calm by Adriano Ohara    Examples of Online Support Options    Hemosphere (https://[employer]/anxiety-depression-support/about): online anxiety and depression support group through the Anxiety and Depression Association of [place].  Mood Disorders Society of [place] Forum (http://www.mdsc.ca/forum/): online forums for a variety of topics including general mood disorders, bipolar disorder, depression, addiction, etc.    National Suicide Prevention Hotline: Call 8-753-230-TALK (8510)    Crisis Text Line:  Text to 584880    Disaster Distress Helpline: Call 1-448.359.9518 or Text \"TalkWithUs\" to 14557    "

## 2022-09-07 NOTE — LETTER
September 7, 2022      Meme Mcintosh  [address] [facility] [id number]        To Whom It May Concern:    Meme Mcintosh  was seen on 9/7/2022.  She may return to work today.        Sincerely,        Physician No Ref-Primary

## 2022-09-07 NOTE — PROGRESS NOTES
Assessment & Plan     Severe episode of recurrent major depressive disorder, without psychotic features (H)  - Sertraline (ZOLOFT) 50 MG tablet; Take one half tablet daily for one week. Then increase to one tablet daily.  - Adult Mental Health  Referral; Future  - PHQ 26 - Pt does have suicidal thoughts but reports she would never act on this as she has three children at home she know she needs to care for. She feels she has a safe and supportive environment at home. Discussed safety plan if suicidal thoughts were to progress.   - Instructed to follow up in one month.    Bilateral thoracic back pain, unspecified chronicity  - Resolved, exam reassuring  - Pain relieved by ibuprofen, tylenol, and flexeril  - OK to return to work note provided       Depression Screening Follow Up    PHQ 9/7/2022   PHQ-9 Total Score 26   Q9: Thoughts of better off dead/self-harm past 2 weeks More than half the days   F/U: Thoughts of suicide or self-harm Yes   F/U: Self harm-plan No   F/U: Self-harm action No   F/U: Safety concerns No     Last PHQ-9 9/7/2022   1.  Little interest or pleasure in doing things 3   2.  Feeling down, depressed, or hopeless 3   3.  Trouble falling or staying asleep, or sleeping too much 3   4.  Feeling tired or having little energy 3   5.  Poor appetite or overeating 3   6.  Feeling bad about yourself 3   7.  Trouble concentrating 3   8.  Moving slowly or restless 3   Q9: Thoughts of better off dead/self-harm past 2 weeks 2   PHQ-9 Total Score 26   In the past two weeks have you had thoughts of suicide or self harm? Yes   Do you have concerns about your personal safety or the safety of others? No   In the past 2 weeks have you thought about a plan or had intention to harm yourself? No   In the past 2 weeks have you acted on these thoughts in any way? No         C-SSRS (Brief [place]) 9/7/2022   Within the last month, have you wished you were dead or wished you could go to sleep and not wake up?  Yes   Within the last month, have you had any actual thoughts of killing yourself? Yes   Within the last month, have you been thinking about how you might do this? No   Within the last month, have you had these thoughts and had some intention of acting on them? No   Within the last month, have you started to work out or worked out the details of how to kill yourself with the intent to carry out this plan? No   Within the last month, have you ever done anything, started to do anything, or prepared to do anything to end your life? No         Follow Up        Follow Up Actions Taken  Crisis resource information provided in the After Visit Summary  Patient to follow up with PCP.  Clinic staff to schedule appointment if able.  Mental Health Referral placed    Discussed the following ways the patient can remain in a safe environment:  remove alcohol, remove drugs, secure medications, and be around others  Patient Instructions   Possible side effects of antidepressants/anxiety meds, including but not limited to GI upset, disrupted sleep, loss of libido, worsening of mood or even possible risk of increased suicidal thoughts.   Often some of these things if not severe will improve after 1-2 weeks on medications but some may not see effects for 3-4 weeks,  if tolerable patients should continue meds and see if there is improvement.  If symptoms are intolerable or for any suicidal thoughts the medication should be stopped immediately and contact the clinic.       These medications should be used for 6-9 months before stopping, to avoid rebound symptoms.   Contact the clinic if having any problems tolerating these medications.  Take the medication daily and do not stop the medication abruptly.  Examples of Relaxation or Mindfulness Apps (available for download on Android and iOS)  Headspace  CBT-I : helps with anxiety and insomnia  Moodpath: helps with depression and/or anxiety  Mindfulness : learn mindfulness and  "meditation skills to help with depression and anxiety  PTSD : helps address trauma  Mindshift: helps teens and young adults who have depression or anxiety    Books to help with anxiety/depression  The Chemistry of Maryan by Adriano Ohara (also has workbook)  The Chemistry of Calm by Adriano Ohara    Examples of Online Support Options    Element Works (https://[employer]/anxiety-depression-support/about): online anxiety and depression support group through the Anxiety and Depression Association of [place].  Mood Disorders Society of [place] Forum (http://www.mdsc.ca/forum/): online forums for a variety of topics including general mood disorders, bipolar disorder, depression, addiction, etc.    National Suicide Prevention Hotline: Call 6-358-004-HHDG (5049)    Crisis Text Line:  Text to 795705    Disaster Distress Helpline: Call 1-128.494.6912 or Text \"TalkWithUs\" to 60323        No follow-ups on file.      López Valentine is a 32 year old, presenting for the following health issues:  Hospital F/U      HPI   Pt seen in ED 8/31 for back pain after car trunk door fell on mid-back. Pt has been alternating ibuprofen, tylenol, and flexeril for pain control and reports this has been effective. Reports return to normal range of motion and readiness to return to work, requesting work note stating OK to return. Pt also reports history of depression and interest in antidepressants.     ED/UC Followup:    Facility:  [facility] ED  Date of visit: 8/31/22  Reason for visit: Back Contusion  Current Status: Improved, needs return to work letter    Review of Systems   Constitutional, HEENT, cardiovascular, pulmonary, gi and gu systems are negative, except as otherwise noted.      Objective    There were no vitals taken for this visit.  There is no height or weight on file to calculate BMI.  Physical Exam   GENERAL: healthy, alert and no distress  EYES: Eyes grossly normal to inspection, PERRL and " conjunctivae and sclerae normal  RESP: lungs clear to auscultation - no rales, rhonchi or wheezes  CV: regular rate and rhythm, normal S1 S2, no S3 or S4, no murmur, click or rub, no peripheral edema and peripheral pulses strong  MS: no gross musculoskeletal defects noted, no edema  SKIN: no suspicious lesions or rashes  NEURO: Normal strength and tone, mentation intact and speech normal  PSYCH: mentation appears normal, affect normal/bright        [unfilled]  [unfilled]  Answers for HPI/ROS submitted by the patient on 9/7/2022  If you checked off any problems, how difficult have these problems made it for you to do your work, take care of things at home, or get along with other people?: Extremely difficult  PHQ9 TOTAL SCORE: 26      Anahi [facility]-FNP Student

## 2022-09-07 NOTE — LETTER
September 7, 2022      Meme Mcintosh  [address] [place] [id number]        To Whom It May Concern:    Meme Mcintosh  was seen on 9/7/2022.  She may return to work today.        Sincerely,        Michelle Montenegro, CNP

## 2022-09-08 NOTE — TELEPHONE ENCOUNTER
Left non-detailed message for patient to return call to clinic.      *does she still need the letter? Letter is in file folder at  if she needs it.    Arianna Hickey RN

## 2022-09-09 NOTE — TELEPHONE ENCOUNTER
Patient says that she found her other letter, and that the letter at the  can be discarded. Denies further needs from the care team at this time. Jeremias LEUNG notified.    Elo Loyd RN  [facility]

## 2022-09-20 ENCOUNTER — HOSPITAL ENCOUNTER (EMERGENCY)
Facility: CLINIC | Age: 32
Discharge: HOME | End: 2022-09-20
Payer: COMMERCIAL